# Patient Record
Sex: FEMALE | Race: WHITE | Employment: STUDENT | URBAN - METROPOLITAN AREA
[De-identification: names, ages, dates, MRNs, and addresses within clinical notes are randomized per-mention and may not be internally consistent; named-entity substitution may affect disease eponyms.]

---

## 2021-10-15 ENCOUNTER — OFFICE VISIT (OUTPATIENT)
Dept: DERMATOLOGY CLINIC | Facility: CLINIC | Age: 21
End: 2021-10-15
Payer: COMMERCIAL

## 2021-10-15 DIAGNOSIS — Z12.83 SCREENING EXAM FOR SKIN CANCER: Primary | ICD-10-CM

## 2021-10-15 DIAGNOSIS — L91.8 SKIN TAG: ICD-10-CM

## 2021-10-15 DIAGNOSIS — D22.9 BENIGN MOLE: ICD-10-CM

## 2021-10-15 PROCEDURE — 99203 OFFICE O/P NEW LOW 30 MIN: CPT | Performed by: PHYSICIAN ASSISTANT

## 2021-10-15 RX ORDER — NORGESTIMATE AND ETHINYL ESTRADIOL 0.25-0.035
KIT ORAL
COMMUNITY
Start: 2020-12-15

## 2021-10-15 NOTE — PROGRESS NOTES
HPI:    Patient ID: Karol Ontiveros is a 23year old female. Patient presents for full body check. She does try to wear sunscreen daily as well. She does not always remember to reapply. She does have fair skin. She has not noted anything of concern.  Does mole  -After discussion with patient, advised the following:  -Benign watch for now  -Pt was agreeable to plan and will comply with discussion above.        3. Skin tag  -After discussion with patient, advised the following:  -Benign for now  -Return if bot

## 2023-08-23 ENCOUNTER — E-ADVICE (OUTPATIENT)
Dept: OBGYN | Age: 23
End: 2023-08-23

## 2023-08-23 ENCOUNTER — TELEPHONE (OUTPATIENT)
Dept: OBGYN | Age: 23
End: 2023-08-23

## 2023-09-09 ENCOUNTER — APPOINTMENT (OUTPATIENT)
Dept: OBGYN | Age: 23
End: 2023-09-09

## 2023-11-20 RX ORDER — DROSPIRENONE 4 MG/1
1 TABLET, FILM COATED ORAL DAILY
Qty: 84 TABLET | OUTPATIENT
Start: 2023-11-20